# Patient Record
Sex: MALE | Race: BLACK OR AFRICAN AMERICAN | NOT HISPANIC OR LATINO | Employment: FULL TIME | URBAN - METROPOLITAN AREA
[De-identification: names, ages, dates, MRNs, and addresses within clinical notes are randomized per-mention and may not be internally consistent; named-entity substitution may affect disease eponyms.]

---

## 2022-09-01 ENCOUNTER — OFFICE VISIT (OUTPATIENT)
Dept: SURGERY | Age: 52
End: 2022-09-01

## 2022-09-01 VITALS
HEART RATE: 84 BPM | TEMPERATURE: 97.5 F | DIASTOLIC BLOOD PRESSURE: 85 MMHG | WEIGHT: 197.7 LBS | SYSTOLIC BLOOD PRESSURE: 129 MMHG | HEIGHT: 72 IN | BODY MASS INDEX: 26.78 KG/M2

## 2022-09-01 DIAGNOSIS — M79.651 PAIN OF RIGHT THIGH: Primary | ICD-10-CM

## 2022-09-01 PROCEDURE — 99202 OFFICE O/P NEW SF 15 MIN: CPT | Performed by: SURGERY

## 2022-09-01 RX ORDER — CYCLOBENZAPRINE HCL 10 MG
10 TABLET ORAL
COMMUNITY
Start: 2022-06-27

## 2022-09-01 RX ORDER — IBUPROFEN 800 MG/1
TABLET ORAL
COMMUNITY
Start: 2022-06-27

## 2022-09-13 ENCOUNTER — TELEPHONE (OUTPATIENT)
Dept: SURGERY | Age: 52
End: 2022-09-13

## 2023-12-30 ENCOUNTER — TELEPHONE (OUTPATIENT)
Dept: FAMILY MEDICINE CLINIC | Facility: CLINIC | Age: 53
End: 2023-12-30

## 2023-12-30 ENCOUNTER — OFFICE VISIT (OUTPATIENT)
Dept: FAMILY MEDICINE CLINIC | Facility: CLINIC | Age: 53
End: 2023-12-30

## 2023-12-30 VITALS
DIASTOLIC BLOOD PRESSURE: 76 MMHG | HEIGHT: 72.75 IN | WEIGHT: 198.25 LBS | TEMPERATURE: 98 F | BODY MASS INDEX: 26.27 KG/M2 | HEART RATE: 81 BPM | SYSTOLIC BLOOD PRESSURE: 121 MMHG

## 2023-12-30 DIAGNOSIS — M51.36 BULGING OF LUMBAR INTERVERTEBRAL DISC: ICD-10-CM

## 2023-12-30 DIAGNOSIS — Z28.21 VARICELLA ZOSTER VIRUS (VZV) VACCINATION DECLINED: ICD-10-CM

## 2023-12-30 DIAGNOSIS — Z00.00 ROUTINE PHYSICAL EXAMINATION: ICD-10-CM

## 2023-12-30 DIAGNOSIS — Z28.21 INFLUENZA VACCINATION DECLINED BY PATIENT: ICD-10-CM

## 2023-12-30 DIAGNOSIS — Z28.21 TETANUS, DIPHTHERIA, AND ACELLULAR PERTUSSIS (TDAP) VACCINATION DECLINED: ICD-10-CM

## 2023-12-30 DIAGNOSIS — Z12.11 COLON CANCER SCREENING: Primary | ICD-10-CM

## 2023-12-30 DIAGNOSIS — Z11.3 ROUTINE SCREENING FOR STI (SEXUALLY TRANSMITTED INFECTION): ICD-10-CM

## 2023-12-30 NOTE — TELEPHONE ENCOUNTER
Patient left forms at . Forms emailed to forms department. Original forms left in forms mailbox. WILLIAM was signed. No fee was paid. Patient does not want forms faxed he wants to pick forms up. He wants to receive a call when forms are ready to be picked up.

## 2023-12-30 NOTE — PATIENT INSTRUCTIONS
All adult screening ordered and done appropriate for patient's age and gender and risk factors and complaints. Patient counseled on the importance of abstinence and if sex occurs of any type condoms should be used every single time. The reality of unwanted pregnancy and all STD including HIV were emphasized. Encouraged physical fitness and daily physical activity daily. Referred to [facility]. CHRISTIAN granted for the patient's lumbar disc bulging to be initiated at 6 days/month to be taken consecutively or intermittently as needed.

## 2024-01-10 ENCOUNTER — LAB ENCOUNTER (OUTPATIENT)
Dept: LAB | Facility: REFERENCE LAB | Age: 54
End: 2024-01-10
Attending: FAMILY MEDICINE
Payer: COMMERCIAL

## 2024-01-10 ENCOUNTER — HOSPITAL ENCOUNTER (OUTPATIENT)
Age: 54
Discharge: HOME | End: 2024-01-10
Payer: COMMERCIAL

## 2024-01-10 VITALS
HEART RATE: 100 BPM | DIASTOLIC BLOOD PRESSURE: 86 MMHG | SYSTOLIC BLOOD PRESSURE: 125 MMHG | OXYGEN SATURATION: 99 % | TEMPERATURE: 99 F | RESPIRATION RATE: 20 BRPM

## 2024-01-10 DIAGNOSIS — Z20.822 LAB TEST NEGATIVE FOR COVID-19 VIRUS: ICD-10-CM

## 2024-01-10 DIAGNOSIS — J06.9 VIRAL UPPER RESPIRATORY ILLNESS: Primary | ICD-10-CM

## 2024-01-10 DIAGNOSIS — Z20.822 ENCOUNTER FOR LABORATORY TESTING FOR COVID-19 VIRUS: ICD-10-CM

## 2024-01-10 DIAGNOSIS — Z00.00 ROUTINE PHYSICAL EXAMINATION: ICD-10-CM

## 2024-01-10 DIAGNOSIS — Z11.3 ROUTINE SCREENING FOR STI (SEXUALLY TRANSMITTED INFECTION): ICD-10-CM

## 2024-01-10 LAB
ALBUMIN SERPL-MCNC: 4.8 G/DL (ref 3.2–4.8)
ALBUMIN/GLOB SERPL: 1.6 {RATIO} (ref 1–2)
ALP LIVER SERPL-CCNC: 69 U/L
ALT SERPL-CCNC: 19 U/L
ANION GAP SERPL CALC-SCNC: 8 MMOL/L (ref 0–18)
AST SERPL-CCNC: 18 U/L (ref ?–34)
BASOPHILS # BLD AUTO: 0.05 X10(3) UL (ref 0–0.2)
BASOPHILS NFR BLD AUTO: 0.6 %
BILIRUB SERPL-MCNC: 1 MG/DL (ref 0.3–1.2)
BUN BLD-MCNC: 9 MG/DL (ref 9–23)
BUN/CREAT SERPL: 7.2 (ref 10–20)
CALCIUM BLD-MCNC: 9.9 MG/DL (ref 8.7–10.4)
CHLORIDE SERPL-SCNC: 107 MMOL/L (ref 98–112)
CHOLEST SERPL-MCNC: 202 MG/DL (ref ?–200)
CO2 SERPL-SCNC: 27 MMOL/L (ref 21–32)
CREAT BLD-MCNC: 1.25 MG/DL
DEPRECATED RDW RBC AUTO: 39.1 FL (ref 35.1–46.3)
EGFRCR SERPLBLD CKD-EPI 2021: 69 ML/MIN/1.73M2 (ref 60–?)
EOSINOPHIL # BLD AUTO: 0.16 X10(3) UL (ref 0–0.7)
EOSINOPHIL NFR BLD AUTO: 1.9 %
ERYTHROCYTE [DISTWIDTH] IN BLOOD BY AUTOMATED COUNT: 12.3 % (ref 11–15)
FASTING PATIENT LIPID ANSWER: NO
FASTING STATUS PATIENT QL REPORTED: NO
GLOBULIN PLAS-MCNC: 3 G/DL (ref 2.8–4.4)
GLUCOSE BLD-MCNC: 91 MG/DL (ref 70–99)
HAV AB SER QL IA: REACTIVE
HAV IGM SER QL: NONREACTIVE
HBV CORE AB SERPL QL IA: NONREACTIVE
HBV SURFACE AB SER QL: REACTIVE
HBV SURFACE AB SERPL IA-ACNC: >1000 MIU/ML
HBV SURFACE AG SERPL QL IA: NONREACTIVE
HCT VFR BLD AUTO: 45.2 %
HCV AB SERPL QL IA: NONREACTIVE
HDLC SERPL-MCNC: 46 MG/DL (ref 40–59)
HGB BLD-MCNC: 15.1 G/DL
IMM GRANULOCYTES # BLD AUTO: 0.03 X10(3) UL (ref 0–1)
IMM GRANULOCYTES NFR BLD: 0.4 %
LDLC SERPL CALC-MCNC: 144 MG/DL (ref ?–100)
LYMPHOCYTES # BLD AUTO: 2.13 X10(3) UL (ref 1–4)
LYMPHOCYTES NFR BLD AUTO: 25.6 %
MCH RBC QN AUTO: 28.8 PG (ref 26–34)
MCHC RBC AUTO-ENTMCNC: 33.4 G/DL (ref 31–37)
MCV RBC AUTO: 86.3 FL
MONOCYTES # BLD AUTO: 1.04 X10(3) UL (ref 0.1–1)
MONOCYTES NFR BLD AUTO: 12.5 %
NEUTROPHILS # BLD AUTO: 4.9 X10 (3) UL (ref 1.5–7.7)
NEUTROPHILS # BLD AUTO: 4.9 X10(3) UL (ref 1.5–7.7)
NEUTROPHILS NFR BLD AUTO: 59 %
NONHDLC SERPL-MCNC: 156 MG/DL (ref ?–130)
OSMOLALITY SERPL CALC.SUM OF ELEC: 292 MOSM/KG (ref 275–295)
PLATELET # BLD AUTO: 295 10(3)UL (ref 150–450)
POCT INFLUENZA A: NEGATIVE
POCT INFLUENZA B: NEGATIVE
POTASSIUM SERPL-SCNC: 4.3 MMOL/L (ref 3.5–5.1)
PROT SERPL-MCNC: 7.8 G/DL (ref 5.7–8.2)
PSA SERPL-MCNC: 1.62 NG/ML (ref ?–4)
RBC # BLD AUTO: 5.24 X10(6)UL
SARS-COV-2 RNA RESP QL NAA+PROBE: NOT DETECTED
SODIUM SERPL-SCNC: 142 MMOL/L (ref 136–145)
T PALLIDUM AB SER QL IA: NONREACTIVE
TRIGL SERPL-MCNC: 68 MG/DL (ref 30–149)
TSI SER-ACNC: 1.11 MIU/ML (ref 0.55–4.78)
VLDLC SERPL CALC-MCNC: 13 MG/DL (ref 0–30)
WBC # BLD AUTO: 8.3 X10(3) UL (ref 4–11)

## 2024-01-10 PROCEDURE — 86780 TREPONEMA PALLIDUM: CPT

## 2024-01-10 PROCEDURE — 87502 INFLUENZA DNA AMP PROBE: CPT | Performed by: NURSE PRACTITIONER

## 2024-01-10 PROCEDURE — 87591 N.GONORRHOEAE DNA AMP PROB: CPT

## 2024-01-10 PROCEDURE — 99203 OFFICE O/P NEW LOW 30 MIN: CPT | Performed by: NURSE PRACTITIONER

## 2024-01-10 PROCEDURE — 86706 HEP B SURFACE ANTIBODY: CPT

## 2024-01-10 PROCEDURE — 86803 HEPATITIS C AB TEST: CPT

## 2024-01-10 PROCEDURE — 84443 ASSAY THYROID STIM HORMONE: CPT

## 2024-01-10 PROCEDURE — 86708 HEPATITIS A ANTIBODY: CPT

## 2024-01-10 PROCEDURE — 85025 COMPLETE CBC W/AUTO DIFF WBC: CPT

## 2024-01-10 PROCEDURE — 86704 HEP B CORE ANTIBODY TOTAL: CPT

## 2024-01-10 PROCEDURE — 87491 CHLMYD TRACH DNA AMP PROBE: CPT

## 2024-01-10 PROCEDURE — U0002 COVID-19 LAB TEST NON-CDC: HCPCS | Performed by: NURSE PRACTITIONER

## 2024-01-10 PROCEDURE — 87389 HIV-1 AG W/HIV-1&-2 AB AG IA: CPT

## 2024-01-10 PROCEDURE — 36415 COLL VENOUS BLD VENIPUNCTURE: CPT

## 2024-01-10 PROCEDURE — 80503 PATH CLIN CONSLTJ SF 5-20: CPT

## 2024-01-10 PROCEDURE — 87340 HEPATITIS B SURFACE AG IA: CPT

## 2024-01-10 PROCEDURE — 80053 COMPREHEN METABOLIC PANEL: CPT

## 2024-01-10 PROCEDURE — 80061 LIPID PANEL: CPT

## 2024-01-10 PROCEDURE — 84153 ASSAY OF PSA TOTAL: CPT

## 2024-01-10 PROCEDURE — 86709 HEPATITIS A IGM ANTIBODY: CPT

## 2024-01-10 NOTE — ED PROVIDER NOTES
Patient Seen in: [facility]      History     Chief Complaint   Patient presents with    Cough/URI    Headache     Stated Complaint: cough    Subjective:   Well-appearing 53-year-old male with no significant past medical history presents with complaints of intermittent headaches, nonproductive cough, chills, body aches, and nasal congestion for the past 5 days.  Patient communicates that he recently started taking Mucinex with some symptom relief.  Patient denies fever.  Patient denies nausea or vomiting.  Patient denies chest pain or shortness of breath.                  Objective:   History reviewed. No pertinent past medical history.           History reviewed. No pertinent surgical history.             Social History     Socioeconomic History    Marital status: Single   Tobacco Use    Smoking status: Never    Smokeless tobacco: Never   Vaping Use    Vaping Use: Never used   Substance and Sexual Activity    Alcohol use: Never    Drug use: Never              Review of Systems    Positive for stated complaint: cough  Other systems are as noted in HPI.  Constitutional and vital signs reviewed.      All other systems reviewed and negative except as noted above.    Physical Exam     ED Triage Vitals [01/10/24 1224]   /86   Pulse 100   Resp 20   Temp 98.9 °F (37.2 °C)   Temp src Temporal   SpO2 99 %   O2 Device None (Room air)       Current:/86   Pulse 100   Temp 98.9 °F (37.2 °C) (Temporal)   Resp 20   SpO2 99%         Physical Exam  VS: Vital signs reviewed. 02 saturation within normal limits for this patient.    General: Patient is awake and alert, oriented to person, place and time. Pt appears non-toxic.     HEENT: Head is normocephalic, atraumatic. Nonicteric sclera, no conjunctival injection. No facial droop or slurred speech. No oral lesions or pallor. Mucous membranes moist. Left and right tympanic membranes normal.  No nasal congestion.  No rhinorrhea.    Neck: No cervical  lymphadenopathy. Supple. Normal ROM.    Heart: Regular rate and rhythm, normal S1, normal S2.    Lungs: Clear to auscultation. Good inspiratory effort. + Airway entry bilaterally without wheezes, rhonchi or crackles. No accessory muscle use or tachypnea.    Abdomen: Non-distended.    Skin: Warm, dry and normal in color.     Psychiatric: Normal affect, judgement normal, insight normal.     CNS: Moves all 4 extremities. Interacts appropriately. No gait abnormality. Memory normal.        ED Course     Labs Reviewed   RAPID SARS-COV-2 BY PCR - Normal   POCT FLU TEST - Normal    Narrative:     This assay is a rapid molecular in vitro test utilizing nucleic acid amplification of influenza A and B viral RNA.     MDM   Medical Decision Making  Well-appearing.  Influenza negative.  Rapid COVID-19 PCR not detected.  Continue taking Mucinex.  I discussed over-the-counter pseudoephedrine as needed for nasal congestion.  Hydration and rest.  Follow-up with PMD.  Return/ED precautions discussed.    Problems Addressed:  Encounter for laboratory testing for COVID-19 virus: acute illness or injury  Lab test negative for COVID-19 virus: acute illness or injury  Viral upper respiratory illness: acute illness or injury    Amount and/or Complexity of Data Reviewed  Labs: ordered. Decision-making details documented in ED Course.    Risk  OTC drugs.        Disposition and Plan     Clinical Impression:  1. Viral upper respiratory illness    2. Encounter for laboratory testing for COVID-19 virus    3. Lab test negative for COVID-19 virus         Disposition:  Discharge  1/10/2024 12:57 pm    Follow-up:  Jimbo Nunez, DO  [address] [place] [address] [id number] [phone]    In 1 week            Medications Prescribed:  There are no discharge medications for this patient.

## 2024-01-11 LAB
C TRACH DNA SPEC QL NAA+PROBE: NEGATIVE
N GONORRHOEA DNA SPEC QL NAA+PROBE: NEGATIVE

## 2024-01-16 ENCOUNTER — PATIENT MESSAGE (OUTPATIENT)
Dept: FAMILY MEDICINE CLINIC | Facility: CLINIC | Age: 54
End: 2024-01-16

## 2024-01-16 NOTE — TELEPHONE ENCOUNTER
Dr. Nunez      *The ACKNOWLEDGE button has been moved to the top right ribbon*    Please sign off on form if you agree to: CHRISTIAN   Starting 01/01/24 Ending 01/01/25 Recer 6 months 06/01/024  1-6 flares ups / month each episode lasting 24 hours  (place your signature on the first page only)    -From your Inbasket, Highlight the patient and click Chart   -Double click the 12/30/23 Forms Completion telephone encounter  -Scroll down to the Media section   -Click the blue Hyperlink: CHRISTIAN Nunez 01/16/24  -Click Acknowledge located in the top right ribbon/menu   -Drag the mouse into the blank space of the document and a + sign will appear. Left click to   electronically sign the document.     Thank you,    Soco

## 2024-01-16 NOTE — TELEPHONE ENCOUNTER
Returned pt call, pt calling wanting status on forms. Informed pt awaiting signature. Pt verbalized understanding.

## 2024-01-17 NOTE — TELEPHONE ENCOUNTER
[place] message sent with instruction per protocol.    Future Appointments   Date Time Provider Department Center   2/8/2024  3:30 PM Priya Ramírez MD [facility]   6/29/2024 10:00 AM [facility]     From: Ted Merida  To: Jimbo Nunez  Sent: 1/16/2024  1:24 PM CST  Subject: Worsening cough    I was seen at [facility] last week for a bad cold, and my cough isn't getting better. Is there an antibiotic that I can be prescribed to help? I have had to call off work twice already.

## 2024-01-18 NOTE — TELEPHONE ENCOUNTER
Forms completed and signed by provider- Sent forms to patient [employer] per WILLIAM he will  forms

## 2024-02-08 ENCOUNTER — OFFICE VISIT (OUTPATIENT)
Dept: GASTROENTEROLOGY | Facility: CLINIC | Age: 54
End: 2024-02-08

## 2024-02-08 ENCOUNTER — TELEPHONE (OUTPATIENT)
Dept: GASTROENTEROLOGY | Facility: CLINIC | Age: 54
End: 2024-02-08

## 2024-02-08 VITALS
BODY MASS INDEX: 26.82 KG/M2 | HEART RATE: 74 BPM | SYSTOLIC BLOOD PRESSURE: 121 MMHG | HEIGHT: 72 IN | WEIGHT: 198 LBS | DIASTOLIC BLOOD PRESSURE: 81 MMHG

## 2024-02-08 DIAGNOSIS — Z12.11 COLON CANCER SCREENING: Primary | ICD-10-CM

## 2024-02-08 PROCEDURE — S0285 CNSLT BEFORE SCREEN COLONOSC: HCPCS | Performed by: INTERNAL MEDICINE

## 2024-02-08 PROCEDURE — 3079F DIAST BP 80-89 MM HG: CPT | Performed by: INTERNAL MEDICINE

## 2024-02-08 PROCEDURE — 3074F SYST BP LT 130 MM HG: CPT | Performed by: INTERNAL MEDICINE

## 2024-02-08 PROCEDURE — 3008F BODY MASS INDEX DOCD: CPT | Performed by: INTERNAL MEDICINE

## 2024-02-08 NOTE — PATIENT INSTRUCTIONS
1. Schedule colonoscopy with [facility] [Diagnosis: CRC screening]    2.  bowel prep from pharmacy (split dose golytely)    3. Avoid all supplements and vitamins for 1 week before the procedure.     4. Read all bowel prep instructions carefully    5. AVOID seeds, nuts, popcorn, raw fruits and vegetables (cooked is okay) for 2-3 days before procedure    6. You will need to go for COVID testing 72 hours before procedure. The testing team will call you a few days before your procedure to notify you where/when you can get COVID testing. If you do not go for COVID testing, the procedure cannot be performed.     7. If you start any NEW medication after your visit today, please notify us. Certain medications will need to be held before the procedure, or the procedure cannot be performed.

## 2024-02-08 NOTE — TELEPHONE ENCOUNTER
Scheduled for: Colonoscopy 12289  Provider Name: Dr Ramírez   Date: Mon 7/01/2024    Location: [facility]   Sedation: [facility]   Time: 1 pm, (pt is aware that [facility] will call the day before to confirm arrival time)  Prep: split trilyte   Meds/Allergies Reconciled?: reviewed by provider   Diagnosis with codes: colon screening Z12.11   Was patient informed to call insurance with codes (Y/N): Yes  Referral sent?: Yes, BCBS PPO   [facility] or [facility] notified?: Electronic case request was sent to [facility] via [facility]/eos.      Medication Orders: Pt is aware to NOT take iron pills, herbal meds and diet supplements for 7 days before exam. Also to NOT take any form of alcohol, recreational drugs and any forms of ED meds 24 hours before exam.     Misc Orders:       Further instructions given by staff: Instructions given in office and pt verbalized understanding

## 2024-02-08 NOTE — H&P
[facility] - Gastroenterology                                                                                                               Reason for consult: CRC screening    Requesting physician or provider: Jimbo Nunez DO    Chief Complaint   Patient presents with    Colonoscopy Screening       HPI:   Ted Merida is a 53 year old year-old male here for the following:    Pt is  here to discuss his options for CRC screening.    Patient currently denies any GI symptoms of nausea, vomiting, dyspepsia, dysphagia, hematemesis, abdominal pain, change in bowel habits, thin stools, hematochezia, or melena.  Additionally there is no weight loss and no reported history of chest pain or shortness of breath.    Denies family h/o CRC.     Prior endoscopies:  None.     Soc:  -denies smoking  -denies heavy Etoh  -no illicit drug use    Wt Readings from Last 6 Encounters:   02/08/24 198 lb (89.8 kg)   12/30/23 198 lb 4 oz (89.9 kg)        History, Medications, Allergies, ROS:      History reviewed. No pertinent past medical history.   History reviewed. No pertinent surgical history.   Family Hx:   Family History   Problem Relation Age of Onset    Diabetes Father     Heart Disorder Father     Diabetes Mother     Cancer Paternal Grandfather         unknown cancer    Cancer Paternal Uncle         brain cancer      Social History:   Social History     Socioeconomic History    Marital status: Single   Tobacco Use    Smoking status: Never    Smokeless tobacco: Never   Vaping Use    Vaping Use: Never used   Substance and Sexual Activity    Alcohol use: Never    Drug use: Never        Medications (Active prior to today's visit):  Current Outpatient Medications   Medication Sig Dispense Refill    PATIENT SUPPLIED MEDICATION daily. Sea minor, black see oil, ashwagandha, kelly      PATIENT SUPPLIED MEDICATION Shilajit  Rhodiola rosea    Panax ginseng      polyethylene glycol, PEG 3350-KCl-NaBcb-NaCl-NaSulf, 236 g Oral Recon Soln Take 4,000 mL by mouth once for 1 dose. As directed by GI clinic instructions. 4000 mL 0       Allergies:  Allergies   Allergen Reactions    Shellfish ANAPHYLAXIS       ROS:   CONSTITUTIONAL:  negative for fevers, rigors  EYES:  negative for diplopia   RESPIRATORY:  negative for severe shortness of breath  CARDIOVASCULAR:  negative for crushing sub-sternal chest pain  GASTROINTESTINAL:  see HPI  GENITOURINARY:  negative for dysuria or gross hematuria  INTEGUMENT/BREAST:  SKIN:  negative for jaundice   ALLERGIC/IMMUNOLOGIC:  negative for hay fever  ENDOCRINE:  negative for cold intolerance and heat intolerance  MUSCULOSKELETAL:  negative for joint effusion/severe erythema  BEHAVIOR/PSYCH:  negative for psychotic behavior      PHYSICAL EXAM:   Blood pressure 121/81, pulse 74, height 6' (1.829 m), weight 198 lb (89.8 kg).    GEN - Patient appears comfortable and in no acute discomfort  ENT - MMM  EYES - the sclera appears anicteric  CV - no edema  RESP -  No increased work of breathing  ABDOMEN - soft, non-tender exam in all quadrants without rigidity or guarding, non-distended, no abnormal bowel sounds noted, no masses are palpated  SKIN - No jaundice  NEURO - Alert and appropriate, and gross movements of extremities normal  PSYCH - normal affect, non-agitated    Labs/Imaging:     Patient's pertinent labs and imaging were reviewed and discussed with patient today.      .  ASSESSMENT/PLAN:   53 M here for the following:    Average risk colon cancer screening: the patient is considered average risk for colon cancer, and it is appropriate to proceed with screening colonoscopy. Patient is currently asymptomatic and denies diarrhea, hematochezia, thin-stools or weight loss. We discussed risks/benefits/alternatives to procedure, including stool testing, and they would like to proceed with a colonoscopy.    Recommend:  -Schedule  colonoscopy w/ [facility]  -Prep: split dose golytely  -hold all vitamins/supplements for 1 week prior to the procedure (pt takes a few)    Colonoscopy consent: I have discussed the risks, benefits, and alternatives to colonoscopy with the patient [who demonstrated understanding], including but not limited to the risks of bleeding, infection, pain, death, as well as the risks of anesthesia and perforation all leading to prolonged hospitalization, surgical intervention, or even death. I also specifically mentioned the miss rate of colonoscopy of 5-10% in the best of all circumstances. All questions were answered to the patient’s satisfaction. The patient signed informed consent and elected to proceed with colonoscopy with intervention [i.e. polypectomy, stent placement, etc.] as indicated.        Orders This Visit:  No orders of the defined types were placed in this encounter.      Meds This Visit:  Requested Prescriptions     Signed Prescriptions Disp Refills    polyethylene glycol, PEG 3350-KCl-NaBcb-NaCl-NaSulf, 236 g Oral Recon Soln 4000 mL 0     Sig: Take 4,000 mL by mouth once for 1 dose. As directed by GI clinic instructions.       Imaging & Referrals:  None         Priya Ramírez MD          This note was partially prepared using Dragon Medical voice recognition dictation software. As a result, errors may occur. When identified, these errors have been corrected. While every attempt is made to correct errors during dictation, discrepancies may still exist.

## 2024-04-04 ENCOUNTER — HOSPITAL ENCOUNTER (OUTPATIENT)
Age: 54
Discharge: HOME | End: 2024-04-04
Payer: COMMERCIAL

## 2024-04-04 VITALS
HEART RATE: 84 BPM | DIASTOLIC BLOOD PRESSURE: 82 MMHG | OXYGEN SATURATION: 100 % | SYSTOLIC BLOOD PRESSURE: 121 MMHG | RESPIRATION RATE: 18 BRPM | TEMPERATURE: 98 F

## 2024-04-04 DIAGNOSIS — H10.31 ACUTE BACTERIAL CONJUNCTIVITIS OF RIGHT EYE: Primary | ICD-10-CM

## 2024-04-04 DIAGNOSIS — J98.01 BRONCHOSPASM: ICD-10-CM

## 2024-04-04 PROCEDURE — 99213 OFFICE O/P EST LOW 20 MIN: CPT | Performed by: EMERGENCY MEDICINE

## 2024-04-04 RX ORDER — ALBUTEROL SULFATE 90 UG/1
AEROSOL, METERED RESPIRATORY (INHALATION)
Qty: 1 EACH | Refills: 0 | Status: SHIPPED | OUTPATIENT
Start: 2024-04-04

## 2024-04-04 RX ORDER — OFLOXACIN 3 MG/ML
SOLUTION/ DROPS OPHTHALMIC
Qty: 5 ML | Refills: 0 | Status: SHIPPED | OUTPATIENT
Start: 2024-04-04

## 2024-04-04 NOTE — ED PROVIDER NOTES
Patient Seen in: [facility]      History     Chief Complaint   Patient presents with    Eye Problem     Stated Complaint: pink red    Subjective:   HPI    Ted Merida is a 53 year old male here for itching, redness, increase in the eye for 1 day.  Exposed to pinkeye from grandchildren.  Also has a persistent cough that is worse at work when he is outside, nighttime, and when he wakes up in the morning time.  No shortness of breath, chest pain.  Mild conservative treatment.  No visual changes, unilateral weakness, severe eye swelling, worst headache of his life, or other complaints at this time.    Objective:   History reviewed. No pertinent past medical history.           History reviewed. No pertinent surgical history.             Social History     Socioeconomic History    Marital status: Single   Tobacco Use    Smoking status: Never    Smokeless tobacco: Never   Vaping Use    Vaping Use: Never used   Substance and Sexual Activity    Alcohol use: Never    Drug use: Never              Review of Systems    Positive for stated complaint: pink red  Other systems are as noted in HPI.  Constitutional and vital signs reviewed.      All other systems reviewed and negative except as noted above.    Physical Exam     ED Triage Vitals [04/04/24 0851]   /82   Pulse 84   Resp 18   Temp 98 °F (36.7 °C)   Temp src Oral   SpO2 100 %   O2 Device None (Room air)       Current:/82   Pulse 84   Temp 98 °F (36.7 °C) (Oral)   Resp 18   SpO2 100%         Physical Exam  Vitals and nursing note reviewed.   Constitutional:       Appearance: Normal appearance. He is not ill-appearing.   HENT:      Head: Normocephalic.      Right Ear: Tympanic membrane, ear canal and external ear normal.      Left Ear: Tympanic membrane, ear canal and external ear normal.      Nose: Nose normal. No congestion.      Mouth/Throat:      Mouth: Mucous membranes are moist.   Eyes:      General:         Right eye: Discharge present.    Cardiovascular:      Rate and Rhythm: Normal rate.   Pulmonary:      Effort: Pulmonary effort is normal. No respiratory distress.      Breath sounds: Rhonchi present. No wheezing.   Musculoskeletal:      Cervical back: Normal range of motion. No rigidity.   Lymphadenopathy:      Cervical: No cervical adenopathy.   Skin:     Capillary Refill: Capillary refill takes less than 2 seconds.      Findings: No erythema or rash.   Neurological:      General: No focal deficit present.      Mental Status: He is alert.   Psychiatric:         Mood and Affect: Mood normal.         Behavior: Behavior normal.         Thought Content: Thought content normal.         Judgment: Judgment normal.               ED Course   Labs Reviewed - No data to display    MDM           Medical Decision Making  DDx bacteria vs viral vs allergic conjunctivitis.  Corneal abrasion.  URI vs simple bronchospasm.  COVID vs flu.    Treat for bacterial conjunctivitis, and bronchospasm.  Eyedrops, albuterol inhaler sent to pharmacy to take as directed.  Supportive care include but not limited to otc treatment, cool mist humidifier, and hydration.   No stridor, No hot muffled speech, and no signs of compromise. Tolerating PO. Neuro wnl without focal deficit.  No photophobia, extreme eye tearing, or direct eye pain..   I Updated patient on all findings, ER precautions, and f/u care as needed. All questions answered. No acute distress and cleared for home.      Problems Addressed:  Acute bacterial conjunctivitis of right eye: acute illness or injury  Bronchospasm: acute illness or injury    Amount and/or Complexity of Data Reviewed  Independent Historian: spouse  External Data Reviewed: notes.    Risk  OTC drugs.  Prescription drug management.        Disposition and Plan     Clinical Impression:  1. Acute bacterial conjunctivitis of right eye    2. Bronchospasm         Disposition:  Discharge  4/4/2024  9:23 am    Follow-up:  Jimbo Nunez DO  [address] [place] [address]  [address]  [address]  [facility] [address]  [phone]                Medications Prescribed:  Current Discharge Medication List        START taking these medications    Details   ofloxacin 0.3 % Ophthalmic Solution Wash your hands.  Pull down the lower lid of your affected eye and apply 1 drop 4 times a day for the next 7 days.  Do not touch tip of the applicator to your eye.  Wash your hands after putting the drops in.  Qty: 5 mL, Refills: 0      albuterol 108 (90 Base) MCG/ACT Inhalation Aero Soln Inhale 1 puff and hold breath for 10 seconds then exhale.  Wait 1 full minute and repeat for second puff.  Use every 4-6 hours as needed.  Qty: 1 each, Refills: 0    Comments: NPI [id number]. Collaborating MD Poppy Moody.

## 2024-04-04 NOTE — ED INITIAL ASSESSMENT (HPI)
Pt with right eye redness and crusting since yesterday; denies fever; exposed to grandchildren with pink eye

## 2024-04-04 NOTE — DISCHARGE INSTRUCTIONS
Inhale 1 puff and hold breath for 10 seconds then exhale.  Wait 1 full minute and repeat for second puff.  Use every 4-6 hours as needed.

## 2024-04-22 ENCOUNTER — HOSPITAL ENCOUNTER (OUTPATIENT)
Dept: GENERAL RADIOLOGY | Facility: HOSPITAL | Age: 54
Discharge: HOME | End: 2024-04-22
Attending: ORTHOPAEDIC SURGERY
Payer: COMMERCIAL

## 2024-04-22 ENCOUNTER — OFFICE VISIT (OUTPATIENT)
Dept: ORTHOPEDICS CLINIC | Facility: CLINIC | Age: 54
End: 2024-04-22
Payer: COMMERCIAL

## 2024-04-22 DIAGNOSIS — M25.551 PAIN OF RIGHT HIP: Primary | ICD-10-CM

## 2024-04-22 DIAGNOSIS — M25.551 PAIN OF RIGHT HIP: ICD-10-CM

## 2024-04-22 DIAGNOSIS — M16.11 PRIMARY OSTEOARTHRITIS OF RIGHT HIP: ICD-10-CM

## 2024-04-22 PROCEDURE — 73502 X-RAY EXAM HIP UNI 2-3 VIEWS: CPT | Performed by: ORTHOPAEDIC SURGERY

## 2024-04-22 RX ORDER — MELOXICAM 15 MG/1
15 TABLET ORAL DAILY
Qty: 30 TABLET | Refills: 0 | Status: SHIPPED | OUTPATIENT
Start: 2024-04-22

## 2024-04-22 NOTE — PROGRESS NOTES
NURSING INTAKE COMMENTS:   Chief Complaint   Patient presents with    Hip Pain     Right side hip pain, has been for 2 years now, pain is constant, cannot sleep at night- takes Aleve for the pain with no improvements, pain 8/10 with movement       HPI: This 53 year old male presents today with complaints of pain in the right hip and groin area.  He has had symptoms in the right hip for couple of years.  He had x-rays 2 years ago which he reports were normal, and an MRI scan a year ago which he reports showed some arthritis.  He had a work-related injury affecting his low back.  He question whether his hip problem might have been related to his work.    No past medical history on file.  No past surgical history on file.  Current Outpatient Medications   Medication Sig Dispense Refill    ofloxacin 0.3 % Ophthalmic Solution Wash your hands.  Pull down the lower lid of your affected eye and apply 1 drop 4 times a day for the next 7 days.  Do not touch tip of the applicator to your eye.  Wash your hands after putting the drops in. 5 mL 0    albuterol 108 (90 Base) MCG/ACT Inhalation Aero Soln Inhale 1 puff and hold breath for 10 seconds then exhale.  Wait 1 full minute and repeat for second puff.  Use every 4-6 hours as needed. 1 each 0    PATIENT SUPPLIED MEDICATION daily. Sea minor, black see oil, ashwagandha, ginger      PATIENT SUPPLIED MEDICATION Shilajit  Rhodiola rosea   Panax ginseng       Allergies   Allergen Reactions    Shellfish ANAPHYLAXIS     Family History   Problem Relation Age of Onset    Diabetes Father     Heart Disorder Father     Diabetes Mother     Cancer Paternal Grandfather         unknown cancer    Cancer Paternal Uncle         brain cancer       Social History     Occupational History    Not on file   Tobacco Use    Smoking status: Never    Smokeless tobacco: Never   Vaping Use    Vaping status: Never Used   Substance and Sexual Activity    Alcohol use: Never    Drug use: Never    Sexual  activity: Not on file        Review of Systems:  GENERAL: feels generally well, no significant weight loss or weight gain  SKIN: no ulcerated or worrisome skin lesions  EYES:denies blurred vision or double vision  HEENT: denies new nasal congestion, sinus pain or ST  LUNGS: denies shortness of breath  CARDIOVASCULAR: denies chest pain  GI: no hematemesis, no worsening heartburn, no diarrhea  : no dysuria, no blood in urine, no difficulty urinating, no incontinence  MUSCULOSKELETAL: no other musculoskeletal complaints other than in HPI  NEURO: no numbness or tingling, no weakness or balance disorder  PSYCHE: no depression or anxiety  HEMATOLOGIC: no hx of blood dyscrasia  ENDOCRINE: no thyroid or diabetes issues  ALL/ASTHMA: no new hx of severe allergy or asthma    Physical Examination:    There were no vitals taken for this visit.  Constitutional: appears well hydrated, alert and responsive, no acute distress noted  Extremities: He is a thin fit appearing male.  He walks with an antalgic gait on the right side.  Stinchfield test is positive.  There is pain with internal and external rotation of the right hip.      Imaging: Mild osteoarthritis of the right hip with a pincer type lesion that could lead to femoral acetabular impingement..     Lab Results   Component Value Date    WBC 8.3 01/10/2024    HGB 15.1 01/10/2024    .0 01/10/2024      Lab Results   Component Value Date    GLU 91 01/10/2024    BUN 9 01/10/2024    CREATSERUM 1.25 01/10/2024        Assessment and Plan:  Diagnoses and all orders for this visit:    Pain of right hip  -     XR HIP W OR WO PELVIS 2 OR 3 VIEWS, RIGHT (CPT=73502); Future    Primary osteoarthritis of right hip        Assessment: He has early OA of the right hip with possible JULIAN.    Plan: I prescribed meloxicam, cortisone injection for the right hip, and physical therapy.  Follow-up after these interventions have been completed.  I would consider an MRI scan if his symptoms  persist in 6 or 8 weeks.    The above note was creating using Dragon speech recognition technology. Please excuse any typos.    NEIL MENON MD

## 2024-06-21 PROBLEM — L91.8 SKIN TAG: Status: ACTIVE | Noted: 2019-09-23

## 2024-06-21 PROBLEM — J06.9 ACUTE URI: Status: ACTIVE | Noted: 2019-09-23

## 2024-06-27 ENCOUNTER — TELEPHONE (OUTPATIENT)
Facility: CLINIC | Age: 54
End: 2024-06-27

## 2024-06-27 NOTE — TELEPHONE ENCOUNTER
Patient called to inform Dr. Ramírez's office that the pharmacy is out of the preparation. Patient states he is leaving town tomorrow morning and will not be back Sunday.  Patient is requesting a call back from the nurse, Please advise.

## 2024-06-27 NOTE — TELEPHONE ENCOUNTER
Spoke to patient    He requested he send the bowel prep to a different pharmacy since the one he usually uses is out of stock.    I verified and updated his pharmacy  Bowel prep sent    Pt verbalized understanding and had no further questions at this time

## 2024-07-01 ENCOUNTER — TELEPHONE (OUTPATIENT)
Facility: CLINIC | Age: 54
End: 2024-07-01

## 2024-07-01 NOTE — TELEPHONE ENCOUNTER
Attempted to contact patient to reschedule; patient voicemail is full & unable to accept messages    Mychart message was sent to patient to call back to resschedule colonsocopy      See 2/8/2024 telephone encounter

## 2024-08-01 PROBLEM — J06.9 ACUTE URI: Status: RESOLVED | Noted: 2019-09-23 | Resolved: 2024-08-01

## 2024-10-21 ENCOUNTER — PATIENT MESSAGE (OUTPATIENT)
Facility: CLINIC | Age: 54
End: 2024-10-21

## 2024-10-21 ENCOUNTER — TELEPHONE (OUTPATIENT)
Dept: GASTROENTEROLOGY | Facility: CLINIC | Age: 54
End: 2024-10-21

## 2024-10-21 PROCEDURE — 88305 TISSUE EXAM BY PATHOLOGIST: CPT | Performed by: INTERNAL MEDICINE

## 2024-10-21 NOTE — TELEPHONE ENCOUNTER
GI staff - Please add the patient to my schedule tomorrow 10/22 or Thursday 10/24 any time that works for him for a phone visit to go over path and next steps.  OK to overbook or use a res-24 slot.  Thanks

## 2024-10-22 NOTE — TELEPHONE ENCOUNTER
Spoke with patient    Your Appointments      Thursday October 24, 2024 10:00 AM  Virtual Phone Visit with Priya Ramírez MD  [facility], [facility] ([facility]) [address] [place] [id number] [phone]   You have been scheduled for a Virtual Telephone visit with your provider. Please be available at your phone so that your physician can contact you, and be prepared with any questions or concerns. As always, your health is our priority.     We suggest confirming with your insurance regarding coverage prior to your appointment as some payors may no longer cover telephone visits. Depending on your insurance you may also be billed a copay at a later time.

## 2024-10-23 NOTE — TELEPHONE ENCOUNTER
Patient scheduled for virtual appt    Your Appointments      Thursday October 24, 2024 10:00 AM  Virtual Phone Visit with Priya Ramírez MD  [facility] ([facility]) [address] [place] [id number] [phone]   You have been scheduled for a Virtual Telephone visit with your provider. Please be available at your phone so that your physician can contact you, and be prepared with any questions or concerns. As always, your health is our priority.     We suggest confirming with your insurance regarding coverage prior to your appointment as some payors may no longer cover telephone visits. Depending on your insurance you may also be billed a copay at a later time.         Tuesday January 21, 2025 3:20 PM  Medicare Annual Health Assessment with Jimbo Nunez DO  [facility], [facility] ([facility]) [address] [id number] [phone]

## 2024-10-24 ENCOUNTER — VIRTUAL PHONE E/M (OUTPATIENT)
Dept: GASTROENTEROLOGY | Facility: CLINIC | Age: 54
End: 2024-10-24

## 2024-10-24 ENCOUNTER — TELEPHONE (OUTPATIENT)
Dept: GASTROENTEROLOGY | Facility: CLINIC | Age: 54
End: 2024-10-24

## 2024-10-24 DIAGNOSIS — Z12.11 COLON CANCER SCREENING: ICD-10-CM

## 2024-10-24 DIAGNOSIS — Z86.0101 HISTORY OF ADENOMATOUS POLYP OF COLON: Primary | ICD-10-CM

## 2024-10-24 PROCEDURE — 99443 PHONE E/M BY PHYS 21-30 MIN: CPT | Performed by: INTERNAL MEDICINE

## 2024-10-24 RX ORDER — SODIUM, POTASSIUM,MAG SULFATES 17.5-3.13G
SOLUTION, RECONSTITUTED, ORAL ORAL
Qty: 1 EACH | Refills: 0 | Status: SHIPPED | OUTPATIENT
Start: 2024-10-24

## 2024-10-24 NOTE — TELEPHONE ENCOUNTER
GI Staff:     Please schedule: Schedule colonoscopy with [facility] with Dr. Bailey for possible EMR in the next 1-2 months    Split dose Suprep bowel prep ordered.    Diagnosis: IC valve adenoma, CRC surveillance    Medication adjustments: none

## 2024-10-24 NOTE — PROGRESS NOTES
Virtual Telephone Check-In    Ted Merida verbally consents to a Virtual/Telephone Check-In visit on 10/24/24.  Patient has been referred to the [facility] website at [email or web address] to review the yearly Consent to Treat document.    Patient understands and accepts financial responsibility for any deductible, co-insurance and/or co-pays associated with this service.    Duration of the service: 30 minutes                                                                                                  [facility] - Gastroenterology                                                                                                      Clinic Follow-up Visit    Chief Complaint   Patient presents with    Follow - Up         Subjective/HPI:     Pt is s/p CLN earlier this week that revealed a tubular adenoma involving the IC valve that wasn't removed - the edge of it was biopsied to confirm the type of polyp.  There other small colon polyps that were removed were either tubular adenomas or hyperplastic polyps.     Patient currently denies any GI symptoms of nausea, vomiting, dyspepsia, dysphagia, hematemesis, abdominal pain, change in bowel habits, thin stools, hematochezia, or melena.  Additionally there is no weight loss and no reported history of chest pain or shortness of breath.    Colonoscopy 10/21/24  Impression:   A 1.5-2 cm polyp involving the IC (ileocecal) valve. Biopsied. Not removed. This will require either interventional GI attempt at resection or surgical resection.   3 polyps measuring 2-10 mm, resected and retrieved.   Small internal hemorrhoids.     Final Diagnosis:      A. Ileocecal valve polyp:  Tubular adenoma.     B. Ascending colon polyp:  Fragments of tubular adenoma.     C. Descending colon polyp:  Tubular adenoma.    Multiple deeper levels were examined microscopically.     D. Sigmoid colon polyp:    Hyperplastic polyp.              Wt Readings from Last 6 Encounters:   10/11/24 198 lb (89.8  kg)   06/21/24 198 lb (89.8 kg)   02/08/24 198 lb (89.8 kg)   12/30/23 198 lb 4 oz (89.9 kg)        History, Medications, Allergies, ROS:      History reviewed. No pertinent past medical history.   Past Surgical History:   Procedure Laterality Date    Colonoscopy N/A 10/21/2024    Procedure: COLONOSCOPY;  Surgeon: Priya Ramírez MD;  Location: [facility] MAIN OR      Family History   Problem Relation Age of Onset    Diabetes Father     Heart Disorder Father     Diabetes Mother     Cancer Paternal Grandfather         unknown cancer    Cancer Paternal Uncle         brain cancer      Social History:   Social History     Socioeconomic History    Marital status: Single   Tobacco Use    Smoking status: Never    Smokeless tobacco: Never   Vaping Use    Vaping status: Never Used   Substance and Sexual Activity    Alcohol use: Never    Drug use: Never     Social Drivers of Health     Food Insecurity: No Food Insecurity (2/1/2023)    Received from [facility], [facility]    Food Insecurity     Within the past 30 days, I worried whether my food would run out before I got money to buy more. / En los últimos 30 días, me preocupó que la comida se podía acabar antes de tener dinero para compr...: Never true / Nunca     Within the past 30 days, the food that I bought just didn't last, and I didn't have money to get more. / En los últimos 30 días, La comida que compré no rindió lo suficiente, y no tenía dinero para...: Never true / Nunca    Received from [facility], [facility]    Housing Stability        Medications (Active prior to today's visit):  Current Outpatient Medications   Medication Sig Dispense Refill    Na Sulfate-K Sulfate-Mg Sulf (SUPREP BOWEL PREP KIT) 17.5-3.13-1.6 GM/177ML Oral Solution Take as directed 1 each 0    Turmeric (QC TUMERIC COMPLEX OR) Take by mouth.      Multiple Vitamins-Minerals (MULTI-VITAMIN/MINERALS) Oral Tab Take 1 tablet  by mouth daily.      Meloxicam 15 MG Oral Tab Take 1 tablet (15 mg total) by mouth daily. 30 tablet 0    ofloxacin 0.3 % Ophthalmic Solution Wash your hands.  Pull down the lower lid of your affected eye and apply 1 drop 4 times a day for the next 7 days.  Do not touch tip of the applicator to your eye.  Wash your hands after putting the drops in. 5 mL 0    albuterol 108 (90 Base) MCG/ACT Inhalation Aero Soln Inhale 1 puff and hold breath for 10 seconds then exhale.  Wait 1 full minute and repeat for second puff.  Use every 4-6 hours as needed. 1 each 0    PATIENT SUPPLIED MEDICATION daily. Sea minor, black see oil, ashwagandha, ginger      PATIENT SUPPLIED MEDICATION Shilajit  Rhodiola rosea   Panax ginseng         Allergies:  Allergies[1]    ROS:   CONSTITUTIONAL:  negative for fevers, chills  EYES:  negative for change in vision  RESPIRATORY:  negative for  shortness of breath  CARDIOVASCULAR:  negative for  chest pain  GASTROINTESTINAL:  see HPI  GENITOURINARY:  negative for dysuria  INTEGUMENT/BREAST:  SKIN:  negative for  rash  ALLERGIC/IMMUNOLOGIC:  negative for hay fever  ENDOCRINE:  negative for cold intolerance and heat intolerance  MUSCULOSKELETAL:  negative for  joint stiffness and joint swelling  BEHAVIOR/PSYCH:  negative for depressed mood    PHYSICAL EXAM:   There were no vitals taken for this visit.    Phone visit.     Labs/Imaging:     Patient's labs and imaging were reviewed and discussed with patient today.     .  ASSESSMENT/PLAN:   54 M here for the following:    IC valve adenoma not removed on CLN 10/2024  H/o colon adenomas  CRC surveillance    Pt is s/p colonoscopy (CLN) earlier this week that revealed a tubular adenoma involving the IC valve that wasn't removed - the edge of it was biopsied to confirm the type of polyp.  There other small colon polyps that were removed were either tubular adenomas or hyperplastic polyps.     We discussed the patient's options for removal of the IC valve polyp,  including surgical resection or repeat colonoscopy (CLN) for endoscopic mucosal resection (EMR) attempt. The patient understands the possibility that the EMR may be unsuccessful or aborted.  The patient also understands the higher risk of bleeding or perforation with EMR and the likely surveillance interval post-EMR (usually 6-12 months post-EMR).  The case was discussed with Dr. Bailey, and he agrees that EMR seems like a reasonable option. The patient would like to proceed with repeat CLN for EMR.      Recommend    - CLN with MAC by Dr. Bailey in the next ~1-2 months    - CLD the day prior, NPO after midnight, split dose Suprep    - Lifestyle  modifications to prevent CRC and colon polyps were reviewed    - Recommend all first degree family members starting CRC screening with a colonoscopy at age 40    Colonoscopy consent: I have discussed the risks, benefits, and alternatives to colonoscopy with the patient/primary decision maker [who demonstrated understanding], including but not limited to the risks of bleeding, infection, pain, death, as well as the risks of anesthesia and perforation all leading to prolonged hospitalization, surgical intervention, or even death. I also specifically mentioned the miss rate of colonoscopy of 5-10% in the best of all circumstances. The patient has agreed to sign an informed consent and elected to proceed with colonoscopy with possible intervention [i.e. polypectomy, stent placement, etc.] as indicated.    > 30 minute consultation/follow up today with >50% spent in face-to-face discussion with the patient/family as well as additional time spent in coordination of care/discussion with care team.         Orders This Visit:  No orders of the defined types were placed in this encounter.      Meds This Visit:  Requested Prescriptions     Signed Prescriptions Disp Refills    Na Sulfate-K Sulfate-Mg Sulf (SUPREP BOWEL PREP KIT) 17.5-3.13-1.6 GM/177ML Oral Solution 1 each 0      Sig: Take as directed       Imaging & Referrals:  None     10/24/2024     Priya Ramírez MD        This note may have been partially prepared using Dragon Medical voice recognition dictation software. As a result, errors may occur. When identified, these errors have been corrected. While every attempt is made to correct errors during dictation, discrepancies may still exist.          [1]   Allergies  Allergen Reactions    Shellfish ANAPHYLAXIS

## 2024-10-24 NOTE — PATIENT INSTRUCTIONS
1. Schedule colonoscopy with [facility] with Dr. Bailey for possibly EMR in the next 1-2 months [Diagnosis: IC valve adenoma, CRC surveillance]    2.  bowel prep from pharmacy (split dose Suprep)    3. Continue all medications for procedure    4. Read all bowel prep instructions carefully    5. AVOID seeds, nuts, popcorn, raw fruits and vegetables (cooked is okay) for 2-3 days before procedure    6. If you start any NEW medication after your visit today, please notify us. Certain medications will need to be held before the procedure, or the procedure cannot be performed.

## 2025-01-29 ENCOUNTER — LAB ENCOUNTER (OUTPATIENT)
Dept: LAB | Age: 55
End: 2025-01-29
Payer: COMMERCIAL

## 2025-01-29 ENCOUNTER — OFFICE VISIT (OUTPATIENT)
Dept: FAMILY MEDICINE CLINIC | Facility: CLINIC | Age: 55
End: 2025-01-29

## 2025-01-29 VITALS — HEIGHT: 72 IN | BODY MASS INDEX: 27.22 KG/M2 | WEIGHT: 201 LBS

## 2025-01-29 DIAGNOSIS — G89.29 CHRONIC RIGHT HIP PAIN: ICD-10-CM

## 2025-01-29 DIAGNOSIS — F32.A DEPRESSION, UNSPECIFIED DEPRESSION TYPE: ICD-10-CM

## 2025-01-29 DIAGNOSIS — M51.369 BULGING OF LUMBAR INTERVERTEBRAL DISC: ICD-10-CM

## 2025-01-29 DIAGNOSIS — Z11.3 SCREENING EXAMINATION FOR STD (SEXUALLY TRANSMITTED DISEASE): ICD-10-CM

## 2025-01-29 DIAGNOSIS — Z00.00 ENCOUNTER FOR ANNUAL PHYSICAL EXAM: ICD-10-CM

## 2025-01-29 DIAGNOSIS — Z72.820 POOR SLEEP: ICD-10-CM

## 2025-01-29 DIAGNOSIS — Z23 NEED FOR VACCINATION: ICD-10-CM

## 2025-01-29 DIAGNOSIS — Z00.00 ENCOUNTER FOR ANNUAL PHYSICAL EXAM: Primary | ICD-10-CM

## 2025-01-29 DIAGNOSIS — M25.551 CHRONIC RIGHT HIP PAIN: ICD-10-CM

## 2025-01-29 LAB
ALBUMIN SERPL-MCNC: 4.8 G/DL (ref 3.2–4.8)
ALBUMIN/GLOB SERPL: 1.8 {RATIO} (ref 1–2)
ALP LIVER SERPL-CCNC: 75 U/L
ALT SERPL-CCNC: 27 U/L
ANION GAP SERPL CALC-SCNC: 13 MMOL/L (ref 0–18)
AST SERPL-CCNC: 35 U/L (ref ?–34)
BILIRUB SERPL-MCNC: 1 MG/DL (ref 0.3–1.2)
BUN BLD-MCNC: 10 MG/DL (ref 9–23)
BUN/CREAT SERPL: 8.3 (ref 10–20)
CALCIUM BLD-MCNC: 10 MG/DL (ref 8.7–10.4)
CHLORIDE SERPL-SCNC: 107 MMOL/L (ref 98–112)
CHOLEST SERPL-MCNC: 204 MG/DL (ref ?–200)
CO2 SERPL-SCNC: 22 MMOL/L (ref 21–32)
CREAT BLD-MCNC: 1.21 MG/DL
EGFRCR SERPLBLD CKD-EPI 2021: 72 ML/MIN/1.73M2 (ref 60–?)
FASTING PATIENT LIPID ANSWER: YES
FASTING STATUS PATIENT QL REPORTED: YES
GLOBULIN PLAS-MCNC: 2.7 G/DL (ref 2–3.5)
GLUCOSE BLD-MCNC: 92 MG/DL (ref 70–99)
HDLC SERPL-MCNC: 39 MG/DL (ref 40–59)
LDLC SERPL CALC-MCNC: 150 MG/DL (ref ?–100)
NONHDLC SERPL-MCNC: 165 MG/DL (ref ?–130)
OSMOLALITY SERPL CALC.SUM OF ELEC: 293 MOSM/KG (ref 275–295)
POTASSIUM SERPL-SCNC: 4.1 MMOL/L (ref 3.5–5.1)
PROT SERPL-MCNC: 7.5 G/DL (ref 5.7–8.2)
SODIUM SERPL-SCNC: 142 MMOL/L (ref 136–145)
T PALLIDUM AB SER QL IA: NONREACTIVE
TRIGL SERPL-MCNC: 80 MG/DL (ref 30–149)
TSI SER-ACNC: 1.23 UIU/ML (ref 0.55–4.78)
VLDLC SERPL CALC-MCNC: 15 MG/DL (ref 0–30)

## 2025-01-29 PROCEDURE — 80061 LIPID PANEL: CPT

## 2025-01-29 PROCEDURE — 36415 COLL VENOUS BLD VENIPUNCTURE: CPT

## 2025-01-29 PROCEDURE — 87491 CHLMYD TRACH DNA AMP PROBE: CPT

## 2025-01-29 PROCEDURE — 87591 N.GONORRHOEAE DNA AMP PROB: CPT

## 2025-01-29 PROCEDURE — 80053 COMPREHEN METABOLIC PANEL: CPT

## 2025-01-29 PROCEDURE — 87389 HIV-1 AG W/HIV-1&-2 AB AG IA: CPT

## 2025-01-29 PROCEDURE — 84443 ASSAY THYROID STIM HORMONE: CPT

## 2025-01-29 PROCEDURE — 86780 TREPONEMA PALLIDUM: CPT

## 2025-01-29 NOTE — PROGRESS NOTES
Ted Merida is a 54 year old male.  Chief Complaint   Patient presents with    Physical     Here for physical    Complete Form     Pt brought in forms to go to forms dept for FMLA     HPI:   Ted Merida presented to the clinic for annual physical examination. No acute concerns. No changes in family/personal history. poor Sleep. Difficulty falling asleep. Normal appetite. Balanced diet. Normal BM/Urination. Physically active.  Sexually active. No daily medications.     Patient with chronic low back pain, chronic right hip pain. Needs renewal of FMLA from PCP. Does not follow with specialist.     Current Outpatient Medications   Medication Sig Dispense Refill    Turmeric (QC TUMERIC COMPLEX OR) Take by mouth.      Multiple Vitamins-Minerals (MULTI-VITAMIN/MINERALS) Oral Tab Take 1 tablet by mouth daily.      Na Sulfate-K Sulfate-Mg Sulf (SUPREP BOWEL PREP KIT) 17.5-3.13-1.6 GM/177ML Oral Solution Take as directed (Patient not taking: Reported on 1/29/2025) 1 each 0      History reviewed. No pertinent past medical history.   Past Surgical History:   Procedure Laterality Date    Colonoscopy N/A 10/21/2024    Procedure: COLONOSCOPY;  Surgeon: Priya Ramírez MD;  Location: [facility] MAIN OR      Social History:  Social History     Socioeconomic History    Marital status: Single   Tobacco Use    Smoking status: Never    Smokeless tobacco: Never   Vaping Use    Vaping status: Never Used   Substance and Sexual Activity    Alcohol use: Never    Drug use: Never     Social Drivers of Health     Food Insecurity: No Food Insecurity (2/1/2023)    Received [facility], [facility]    Food Insecurity     Within the past 30 days, I worried whether my food would run out before I got money to buy more. / En los últimos 30 días, me preocupó que la comida se podía acabar antes de tener dinero para compr...: Never true / Nunca     Within the past 30 days, the food that I bought just didn't last,  and I didn't have money to get more. / En los últimos 30 días, La comida que compré no rindió lo suficiente, y no tenía dinero para...: Never true / Nunca    Received from [facility], [facility]    Housing Stability      Family History   Problem Relation Age of Onset    Diabetes Father     Heart Disorder Father     Diabetes Mother     Cancer Paternal Grandfather         unknown cancer    Cancer Paternal Uncle         brain cancer      Allergies[1]     REVIEW OF SYSTEMS:   Review of Systems   Constitutional:  Negative for activity change.   HENT: Negative.     Eyes: Negative.    Respiratory:  Negative for chest tightness and shortness of breath.    Cardiovascular:  Negative for chest pain and palpitations.   Gastrointestinal:  Negative for abdominal pain, constipation and diarrhea.   Genitourinary: Negative.  Negative for difficulty urinating.   Musculoskeletal:  Positive for back pain and myalgias.        Right hip pain   Skin: Negative.    Neurological: Negative.  Negative for dizziness and headaches.   Psychiatric/Behavioral: Negative.     All other systems reviewed and are negative.     Wt Readings from Last 5 Encounters:   01/29/25 201 lb (91.2 kg)   10/11/24 198 lb (89.8 kg)   06/21/24 198 lb (89.8 kg)   02/08/24 198 lb (89.8 kg)   12/30/23 198 lb 4 oz (89.9 kg)     Body mass index is 27.26 kg/m².      EXAM:   Ht 6' (1.829 m)   Wt 201 lb (91.2 kg)   BMI 27.26 kg/m²   Physical Exam  Vitals and nursing note reviewed.   Constitutional:       Appearance: Normal appearance.   HENT:      Head: Normocephalic and atraumatic.      Right Ear: Tympanic membrane and external ear normal.      Left Ear: Tympanic membrane and external ear normal.      Mouth/Throat:      Mouth: Mucous membranes are moist.      Pharynx: Oropharynx is clear.   Eyes:      Conjunctiva/sclera: Conjunctivae normal.      Pupils: Pupils are equal, round, and reactive to light.   Cardiovascular:      Rate and  Rhythm: Normal rate and regular rhythm.      Pulses: Normal pulses.      Heart sounds: Normal heart sounds.   Pulmonary:      Effort: Pulmonary effort is normal.      Breath sounds: No wheezing.   Abdominal:      General: Abdomen is flat. There is no distension.      Palpations: Abdomen is soft. There is no mass.      Tenderness: There is no abdominal tenderness. There is no guarding.      Hernia: No hernia is present.   Musculoskeletal:         General: Normal range of motion.      Cervical back: Normal range of motion.   Skin:     General: Skin is warm.   Neurological:      General: No focal deficit present.      Mental Status: He is alert and oriented to person, place, and time.   Psychiatric:         Mood and Affect: Mood normal.         Behavior: Behavior normal.            ASSESSMENT AND PLAN:   (Z00.00) Encounter for annual physical exam  (primary encounter diagnosis)  Plan: Lipid Panel [E], TSH W Reflex To Free T4 [E],         Comp Metabolic Panel (14) [E]  - declined flu vaccination.   - tobacco, alcohol, illicit drug use discouraged  - safe sexual practices advised  - Reinforced healthy diet, lifestyle, and exercise.  - Past Medical/Social/Family histories reviewed  - Regular dental visits recommended   - Regular eye exams recommended     No recommendations at this time  No recommendations at this time   Health Maintenance   Topic Date Due    Colorectal Cancer Screening  10/21/2034      Patient had colonoscopy with Dr. Ramírez. 2 polyp removed. Needs to follow up for 3rd polyp removal. Needs to schedule.     No recommendations at this time      (Z11.3) Screening examination for STD (sexually transmitted disease)  Plan: HIV AG AB Combo [E], T Pallidum Screening         Cascade TREP [E], Chlamydia/Gc Amplification         [E]        STD screening completed. Further management pending results. Safe sexual practices advised.     (M51.369) Bulging of lumbar intervertebral disc  (M25.551,  G89.29) Chronic right  hip pain  Plan: patient with chronic back pain and chronic right hip pain. Does not follow with specialist. Asking for renewal of FMLA. Advised at discretion of PCP.     (F32.A) Depression, unspecified depression type  Plan: triggers - work. Looking to transfer within his current job, which he believes will help. Denies suicidal ideations or self harm. Declined therapy or pharmacological management. Will follow up if desires in the future.     (Z72.820) Poor sleep  Plan: difficulty falling asleep. Using OTC management with some improvement. Can try OTC Unisom or melatonin. Sleep hygiene advised. Follow up as needed.     (Z23) Need for vaccination  Plan: declined vaccinations today. Discussed benefits.     Follow up in 1 year or sooner if needed      The patient indicates understanding of these issues and agrees to the plan.  Chaperone offered to the patient prior to examination    This note was prepared using Dragon Medical voice recognition dictation software. As a result errors may occur. When identified these errors have been corrected. While every attempt is made to correct errors during dictation discrepancies may still exist.       [1]   Allergies  Allergen Reactions    Shellfish ANAPHYLAXIS

## 2025-01-30 LAB
C TRACH DNA SPEC QL NAA+PROBE: NEGATIVE
N GONORRHOEA DNA SPEC QL NAA+PROBE: NEGATIVE

## 2025-01-31 ENCOUNTER — TELEPHONE (OUTPATIENT)
Dept: FAMILY MEDICINE CLINIC | Facility: CLINIC | Age: 55
End: 2025-01-31

## 2025-02-12 NOTE — TELEPHONE ENCOUNTER
Patient called to ask the status of his forms.    Type of Leave: Family Medical Leave Act RE-CERT  Reason for Leave:LOWER BACK PAIN/ARTHRITIS   Start date of leave:1/2/25  End date of leave:1/2/26  How many flare ups per month/length?:  How many appts per month/length?:   Was Fee and Turnaround info Given?:     Patient states all of his information from the last form is the same. He asked if we could complete them as soon as possible. I flagged the email and let the rep know.

## 2025-02-12 NOTE — TELEPHONE ENCOUNTER
Dr. Nunez      Please sign off on form if you agree to: Family Medical Leave Act re-cert low back pain start: 01/02/2025 End: 07/02/25  (place your signature on the first page only)    -From your Inbasket, Highlight the patient and click Chart   -Double click the 01/31/2025 Forms Completion telephone encounter  -Scroll down to the Media section   -Click the blue Hyperlink: Family Medical Leave Act Dr Nunez 02/12/25  -Click Acknowledge located in the top right ribbon/menu   -Drag the mouse into the blank space of the document and a + sign will appear. Left click to   electronically sign the document.     Thank you,    Manjinder

## 2025-02-17 NOTE — TELEPHONE ENCOUNTER
Patient called to check the status on forms- Advised forms completed and signed - per patient they will  forms in office today

## 2025-02-18 ENCOUNTER — PATIENT MESSAGE (OUTPATIENT)
Dept: FAMILY MEDICINE CLINIC | Facility: CLINIC | Age: 55
End: 2025-02-18

## 2025-02-19 NOTE — TELEPHONE ENCOUNTER
I did not complete any forms. See TE from forms completed with Dr. Nunez. Dr. Nunez agreed to   1 year leave. Please assist patient. Thank you .

## 2025-02-20 NOTE — TELEPHONE ENCOUNTER
Per Amy message patient states that Family Medical Leave Act forms end date is for 7/2/25 and should be for one year with end date of 7/2/26. I did see that last set of forms completed were for 1 yr and not 6 months. Will let rep know who is working on revisions and place in her box for a revision.

## (undated) NOTE — LETTER
Date & Time: 4/4/2024, 9:24 AM  Patient: Ted Merida  Encounter Provider(s):    Iris Henao APRN       To Whom It May Concern:    Ted Merida was seen and treated in our department on 4/4/2024. He should not return to work until 04/05/2024  due to pink eye. If there is continued drainage please excuse tomorrow 04/05/2024. No covid related. No fever.     ELAINE Parra, 04/04/24, 9:25 AM\

## (undated) NOTE — LETTER
10/24/2024      Ted Merida  [address] [place] [id number]         Dear Ted,    This letter is to inform you that our office has attempted to reach you by phone without success (voicemail box not set up).  We were attempting to contact you by phone regarding scheduling Colonoscopy with possible Endoscopic Mucosal Resection.     Please contact our office at the number listed below as soon as you receive this letter to discuss this issue and to make the necessary changes in our system to your contact information.        Thank you for your cooperation.        Sincerely,    Priya Ramírez MD  [address]  [facility] [phone]

## (undated) NOTE — LETTER
11/14/2024    Ted Merida  [address] [place] [id number]         Dear Ted,    This letter is to inform you that our office has made several attempts to reach you by phone without success.  We were attempting to contact you by phone regarding scheduling Colonoscopy with Endoscopic Mucosal Resection.    Please contact our office at the number listed below as soon as you receive this letter to discuss this issue and to make the necessary changes in our system to your contact information.        Thank you for your cooperation.        Sincerely,    Priya Ramírez MD  [address]  [facility] [id number] [phone]